# Patient Record
(demographics unavailable — no encounter records)

---

## 2017-03-26 NOTE — PD
HPI


Chief Complaint:  FEVER/VOMITING/COUGH


Time Seen by Provider:  21:36


Travel History


International Travel<30 days:  No


Contact w/Intl Traveler<30days:  No


Traveled to known affect area:  No





History of Present Illness


HPI


1 year 5-month-old Afro-American male presents the emergency Department with one

-day history of fever, fussiness, decreased activity, cough, decreased appetite

, and posttussive emesis 2.  Patient is currently teething.  He has a history 

of otitis media in the past, but not since last November.  Patient is up-to-

date on his immunizations.  He has no rash.  He is still urinating.  No 

diarrhea.  He has no known drug allergies.





History


Past Medical History


Immunizations Current:  Yes





Social History


Tobacco Use in Home:  Yes


Alcohol Use:  No


Tobacco Use:  No


Substance Use:  No





Allergies-Medications


(Allergen,Severity, Reaction):  


Coded Allergies:  


     No Known Allergies (Unverified , 11/26/16)


Reported Meds & Prescriptions





Reported Meds & Active Scripts


Active


Amoxicillin Liq (Amoxicillin) 400 Mg/5 Ml Susp 400 Mg PO BID 10 Days


Amoxicillin Liq (Amoxicillin) 400 Mg/5 Ml Susp 400 Mg PO BID 10 Days








ROS


Except as stated in HPI:  all other systems reviewed are Neg


Constitutional:  Positive: Fever, Poor Feeding, Decreased Activity


Eyes:  No: Drainage


HENT:  Positive: Rhinitis, Rhinorrhea, Congestion, Dental Difficulties (teething

),  No: Nosebleed, Neck Stiffness, Neck Pain, Earache


Cardiovascular:  No: Cyanosis


Respiratory:  Positive: Cough, Post-tussive emesis,  No: Croupy Cough, 

Shortness of Breath, Wheezing


Gastrointestinal:  Positive: Vomiting (posttussive 2.),  No: Diarrhea, 

Abdominal Pain


Genitourinary:  No: Decreased Urinary Output


Musculoskeletal:  No: Edema


Skin:  No Rash


Neurologic:  No: Change in Mentation


Psychiatric:  No: Depression


Endocrine:  No: Polyuria, Polydipsia


Hematologic:  No: Easy Bruising





Physical Exam


Narrative


GENERAL APPEARANCE: This 1Y 5M year old patient is a well-developed, well-

nourished, child in mild distress.  


SKIN: Skin is warm and dry without erythema, swelling or exudate.  No rash 

There is good turgor. No tenting.


HEENT: Throat is clear with mild to moderate erythema, mild swelling and white 

exudate. Mucous membranes are moist. Uvula is midline. Airway is patent.  

Patient is teething.  The pupils are equal, round and reactive to light. Extra 

ocular motions are intact. No drainage or injection. The ears show bilateral 

tympanic membranes with moderate erythema, dullness and loss of landmarks left 

greater than right. No perforation.


NECK: Supple and non tender with full range of motion without discomfort. No 

meningeal signs.


LUNGS: Equal and bilateral breath sounds without wheezes, rales or rhonchi.


CHEST: The chest wall is without retractions or use of accessory muscles.


HEART: Has a regular rate and rhythm without murmur, gallops, click or rub.


ABDOMEN: Soft, non tender with positive active bowel sounds. No rebound 

tenderness. No masses, no hepatosplenomegaly.


EXTREMITIES: Without cyanosis, clubbing or edema. Equal 2+ distal pulses and 2 

second capillary refill noted.


NEUROLOGIC: The patient is alert, aware, and appropriately interactive with 

parent and with examiner. The patient moves all extremities with normal muscle 

strength. Normal muscle tone is noted. Normal coordination is noted.





Data


Data


Last Documented VS





Vital Signs








  Date Time  Temp Pulse Resp B/P Pulse Ox O2 Delivery O2 Flow Rate FiO2


 


3/26/17 21:19 100.5 156   96   








Orders





 Group A Rapid Strep Screen (3/26/17 21:41)


Influenzae A/B Antigen (3/26/17 21:41)


Respiratory Syncytial Virus (3/26/17 21:41)


Acetaminophen 160 Mg/5 Ml Liq (Tylenol 1 (3/26/17 21:45)


Amoxicil-Clavu 400 Mg/5 Ml Liq (Augmenti (3/26/17 22:15)


Strep Culture (Group A) (3/26/17 21:45)








MDM


Medical Decision Making


Medical Screen Exam Complete:  Yes


Emergency Medical Condition:  Yes


Differential Diagnosis


Viral illness.  Bilateral otitis media.  RSV.  Influenza.


Narrative Course


Patient is medically stable at time of exam.


Rapid strep, influenza, and RSV are sent to the lab.


Patient is given Tylenol based on his weight 10 mg/kg.  By mouth.


Patient is given 400 mg per 5 mL suspension of amoxicillin once by mouth.


Patient will be continued on amoxicillin 400 mg per 5 mL suspension, 5 mL twice 

a day for 10 days.


Patient is to continue on ibuprofen and Tylenol as needed for fever control.


Patient should follow with his pediatrician in the next 10-14 days to ensure 

improvement.


Patient can return to emergency Department with worsening symptoms if warranted.





Diagnosis





 Primary Impression:  


 Otitis media in pediatric patient


 Qualified Code:  H66.93 - Otitis media in pediatric patient, bilateral


Referrals:  


Pediatrician


call for appointment


Patient Instructions:  Acetaminophen and Ibuprofen Dosing in Children (ED), 

Otitis Media in Children (DC)





***Additional Instructions:


Patient will be continued on amoxicillin 400 mg per 5 mL suspension, 5 mL twice 

a day for 10 days.


Patient is to continue on ibuprofen and Tylenol as needed for fever control.


Patient should follow with his pediatrician in the next 10-14 days to ensure 

improvement.


Patient can return to emergency Department with worsening symptoms if warranted.


***Med/Other Pt SpecificInfo:  Prescription(s) given


Scripts


Amoxicillin Liq 400 Mg/5 Ml [id number] Mg PO BID  10 Days  Ref 0


   Prov:Mukul Arrieta MD         3/26/17


Disposition:  01 DISCHARGE HOME


Condition:  Stable








Fuentes Willams Mar 26, 2017 21:41